# Patient Record
(demographics unavailable — no encounter records)

---

## 2025-06-25 NOTE — HISTORY OF PRESENT ILLNESS
[FreeTextEntry8] : 37 year old female presents c/o rash to the left side of her neck. She has h/o likely eczema or similar type of rash for which she had been following with a dermatologist in [place]. She was prescribed a topical cream which helped. She had been doing well up until the past couple of months. The rash has reoccurred. What has concerned is the development of "red bumps" alongside the dry scaly rash. She denies any fever. She also is concerned she has an underlying yeast infection. She c/o burning with urination and white vaginal discharge x 2 days. She continues to experience pain to her left lower ribs that can radiate towards her back. She had recent CT chest which came back normal. She is not certain what to do at this time as her pain persists. She remains on Tirzepatide for weight loss- reports Zepbound was not covered by her insurance, may be interested in self pay option.

## 2025-06-25 NOTE — PHYSICAL EXAM
[No Acute Distress] : no acute distress [Well Nourished] : well nourished [Well Developed] : well developed [Normal Appearance] : was normal in appearance [Neck Supple] : was supple [No Respiratory Distress] : no respiratory distress  [Clear to Auscultation] : lungs were clear to auscultation bilaterally [Normal Rate] : normal rate  [Regular Rhythm] : with a regular rhythm [Normal S1, S2] : normal S1 and S2 [No Murmur] : no murmur heard [Alert and Oriented x3] : oriented to person, place, and time [de-identified] : + dry scaly rash to left side of neck along with red maculopapular rash

## 2025-06-25 NOTE — ASSESSMENT
[FreeTextEntry1] : Rash to left side of neck - may have underlying eczema/atopic dermatitis - trial of Triamcinolone cream, use as directed - referred to dermatology  Yeast infection - trial of Fluconazole, take as directed - will check urine culture to r/o UTI - recommend GYN evaluation if no improvement  Left sided rib pain - reviewed recent CT chest results - will refer patient to orthopedist for further evaluation  Obesity - can call back if she would like for us to send script for Zepbound to [facility] for self pay  - c/w diet and exercise as tolerated

## 2025-06-25 NOTE — REVIEW OF SYSTEMS
[Skin Rash] : skin rash [Negative] : Respiratory [FreeTextEntry8] : as per HPI [FreeTextEntry9] : as per HPI